# Patient Record
Sex: FEMALE | Race: WHITE | URBAN - METROPOLITAN AREA
[De-identification: names, ages, dates, MRNs, and addresses within clinical notes are randomized per-mention and may not be internally consistent; named-entity substitution may affect disease eponyms.]

---

## 2020-12-14 ENCOUNTER — WEB ENCOUNTER (OUTPATIENT)
Dept: URBAN - METROPOLITAN AREA CLINIC 96 | Facility: CLINIC | Age: 55
End: 2020-12-14

## 2020-12-14 ENCOUNTER — OFFICE VISIT (OUTPATIENT)
Dept: URBAN - METROPOLITAN AREA CLINIC 96 | Facility: CLINIC | Age: 55
End: 2020-12-14
Payer: COMMERCIAL

## 2020-12-14 VITALS
BODY MASS INDEX: 22.26 KG/M2 | HEIGHT: 62 IN | DIASTOLIC BLOOD PRESSURE: 83 MMHG | TEMPERATURE: 95.2 F | HEART RATE: 83 BPM | SYSTOLIC BLOOD PRESSURE: 138 MMHG | WEIGHT: 121 LBS

## 2020-12-14 DIAGNOSIS — K90.0 CELIAC DISEASE: ICD-10-CM

## 2020-12-14 DIAGNOSIS — Z12.11 COLON CANCER SCREENING: ICD-10-CM

## 2020-12-14 PROCEDURE — 99204 OFFICE O/P NEW MOD 45 MIN: CPT | Performed by: INTERNAL MEDICINE

## 2020-12-14 PROCEDURE — 99244 OFF/OP CNSLTJ NEW/EST MOD 40: CPT | Performed by: INTERNAL MEDICINE

## 2020-12-14 RX ORDER — SODIUM, POTASSIUM,MAG SULFATES 17.5-3.13G
354 ML SOLUTION, RECONSTITUTED, ORAL ORAL
Qty: 354 ML | Refills: 0 | OUTPATIENT
Start: 2020-12-14 | End: 2020-12-15

## 2020-12-14 NOTE — HPI-TODAY'S VISIT:
55 y.o. WF, lives by self, used to work for [employer] PCP - Lucas Fuentes Found to have severe osteoporosis  Then, blood work found celiac disease No GI syx Bowels regular w/o blood No rashes Will notice that scratching back after dinner but limited

## 2021-01-20 ENCOUNTER — CLAIMS CREATED FROM THE CLAIM WINDOW (OUTPATIENT)
Dept: URBAN - METROPOLITAN AREA CLINIC 4 | Facility: CLINIC | Age: 56
End: 2021-01-20
Payer: COMMERCIAL

## 2021-01-20 ENCOUNTER — OFFICE VISIT (OUTPATIENT)
Dept: URBAN - METROPOLITAN AREA SURGERY CENTER 18 | Facility: SURGERY CENTER | Age: 56
End: 2021-01-20
Payer: COMMERCIAL

## 2021-01-20 ENCOUNTER — WEB ENCOUNTER (OUTPATIENT)
Dept: URBAN - METROPOLITAN AREA CLINIC 92 | Facility: CLINIC | Age: 56
End: 2021-01-20

## 2021-01-20 DIAGNOSIS — D12.5 ADENOMA OF SIGMOID COLON: ICD-10-CM

## 2021-01-20 DIAGNOSIS — D12.5 BENIGN NEOPLASM OF SIGMOID COLON: ICD-10-CM

## 2021-01-20 DIAGNOSIS — Z12.11 COLON CANCER SCREENING: ICD-10-CM

## 2021-01-20 DIAGNOSIS — K21.9 ACID REFLUX: ICD-10-CM

## 2021-01-20 DIAGNOSIS — K31.89 OTHER DISEASES OF STOMACH AND DUODENUM: ICD-10-CM

## 2021-01-20 DIAGNOSIS — K31.89 DYSPEPSIA AND OTHER SPECIFIED DISORDERS OF FUNCTION OF STOMACH: ICD-10-CM

## 2021-01-20 DIAGNOSIS — K90.0 CELIAC DISEASE: ICD-10-CM

## 2021-01-20 DIAGNOSIS — K21.00 GASTRO-ESOPHAGEAL REFLUX DISEASE WITH ESOPHAGITIS, WITHOUT BLEEDING: ICD-10-CM

## 2021-01-20 DIAGNOSIS — K90.1 SPRUE: ICD-10-CM

## 2021-01-20 PROCEDURE — 43239 EGD BIOPSY SINGLE/MULTIPLE: CPT | Performed by: INTERNAL MEDICINE

## 2021-01-20 PROCEDURE — 88305 TISSUE EXAM BY PATHOLOGIST: CPT | Performed by: PATHOLOGY

## 2021-01-20 PROCEDURE — G8907 PT DOC NO EVENTS ON DISCHARG: HCPCS | Performed by: INTERNAL MEDICINE

## 2021-01-20 PROCEDURE — 88312 SPECIAL STAINS GROUP 1: CPT | Performed by: PATHOLOGY

## 2021-01-20 PROCEDURE — 45385 COLONOSCOPY W/LESION REMOVAL: CPT | Performed by: INTERNAL MEDICINE

## 2021-01-20 PROCEDURE — 88342 IMHCHEM/IMCYTCHM 1ST ANTB: CPT | Performed by: PATHOLOGY

## 2021-01-20 RX ORDER — PANTOPRAZOLE SODIUM 40 MG/1
1 TABLET TABLET, DELAYED RELEASE ORAL
Qty: 180 | Refills: 0 | OUTPATIENT

## 2021-01-22 ENCOUNTER — WEB ENCOUNTER (OUTPATIENT)
Dept: URBAN - METROPOLITAN AREA CLINIC 96 | Facility: CLINIC | Age: 56
End: 2021-01-22

## 2021-01-22 ENCOUNTER — WEB ENCOUNTER (OUTPATIENT)
Dept: URBAN - METROPOLITAN AREA CLINIC 92 | Facility: CLINIC | Age: 56
End: 2021-01-22

## 2021-01-25 ENCOUNTER — OFFICE VISIT (OUTPATIENT)
Dept: URBAN - METROPOLITAN AREA TELEHEALTH 2 | Facility: TELEHEALTH | Age: 56
End: 2021-01-25
Payer: COMMERCIAL

## 2021-01-25 DIAGNOSIS — K90.0 CELIAC DISEASE: ICD-10-CM

## 2021-01-25 PROCEDURE — 97803 MED NUTRITION INDIV SUBSEQ: CPT | Performed by: DIETITIAN, REGISTERED

## 2021-01-25 RX ORDER — PANTOPRAZOLE SODIUM 40 MG/1
1 TABLET TABLET, DELAYED RELEASE ORAL
Qty: 180 | Refills: 0 | Status: ACTIVE | COMMUNITY

## 2021-02-11 ENCOUNTER — WEB ENCOUNTER (OUTPATIENT)
Dept: URBAN - METROPOLITAN AREA CLINIC 96 | Facility: CLINIC | Age: 56
End: 2021-02-11

## 2021-02-12 ENCOUNTER — WEB ENCOUNTER (OUTPATIENT)
Dept: URBAN - METROPOLITAN AREA CLINIC 96 | Facility: CLINIC | Age: 56
End: 2021-02-12

## 2021-03-18 ENCOUNTER — WEB ENCOUNTER (OUTPATIENT)
Dept: URBAN - METROPOLITAN AREA CLINIC 96 | Facility: CLINIC | Age: 56
End: 2021-03-18

## 2021-03-18 RX ORDER — PANTOPRAZOLE SODIUM 40 MG/1
1 TABLET TABLET, DELAYED RELEASE ORAL ONCE A DAY
Qty: 90 TABLET | Refills: 3 | OUTPATIENT

## 2021-05-03 ENCOUNTER — LAB OUTSIDE AN ENCOUNTER (OUTPATIENT)
Dept: URBAN - METROPOLITAN AREA CLINIC 96 | Facility: CLINIC | Age: 56
End: 2021-05-03

## 2021-05-03 ENCOUNTER — WEB ENCOUNTER (OUTPATIENT)
Dept: URBAN - METROPOLITAN AREA CLINIC 96 | Facility: CLINIC | Age: 56
End: 2021-05-03

## 2021-05-03 ENCOUNTER — OFFICE VISIT (OUTPATIENT)
Dept: URBAN - METROPOLITAN AREA CLINIC 96 | Facility: CLINIC | Age: 56
End: 2021-05-03
Payer: COMMERCIAL

## 2021-05-03 DIAGNOSIS — K90.0 CELIAC DISEASE: ICD-10-CM

## 2021-05-03 DIAGNOSIS — M81.8 OTHER OSTEOPOROSIS, UNSPECIFIED PATHOLOGICAL FRACTURE PRESENCE: ICD-10-CM

## 2021-05-03 LAB
ABSOLUTE BASOPHIL COUNT: 0.04
ABSOLUTE EOSINOPHIL COUNT: 0.51
ABSOLUTE IMMATURE GRANULOCYTE  COUNT: 0.02
ABSOLUTE LYMPHOCYTE COUNT: 1.78
ABSOLUTE MONOCYTE COUNT: 0.47
ABSOLUTE NEUTROPHIL COUNT (ANC): 3.55
ABSOLUTE NRBC  COUNT: 0
AG RATIO: 1
ALBUMIN LEVEL: 4.4
ALK PHOS: 90
ALT: 17
ANION GAP: 9
AST: 24
BASOPHIL AUTO: 1
BILIRUBIN TOTAL: 0.3
BUN/CREAT RATIO: 24
BUN: 18
CALCIUM LEVEL: 9.4
CHLORIDE LEVEL: 103
CO2 LEVEL: 25
CREATININE LEVEL: 0.7
EOS AUTO: 8
FERRITIN LEVEL: 5.5
FOLATE LEVEL: 7.5
GFR AFRICAN AMERICAN: >60
GFR NON AFRICAN AMERICAN: >60
GLUCOSE LEVEL: 103
HCT: 36
HGB: 11.3
IMMATURE GRANULOCYTES AUTO: 0.3
IRON LEVEL: 22
IRON SAT: 5
LYMPH AUTO: 28
MCH: 24.6
MCHC: 31.4
MCV: 78.4
MONO AUTO: 7
MPV: 8.8
NEUTRO AUTO: 56
NRBC AUTO: 0
OSMO (CALC): 276
PERFORMING LAB: (no result)
PLATELETS: 343
POTASSIUM LEVEL: 4.3
PROTEIN TOTAL: 7.7
RBC: 4.59
RDW: 16.5
SODIUM LEVEL: 137
TIBC: 434
VITAMIN B12 LEVEL: 465
VITAMIN D 25 OH: 33
WBC: 6.4

## 2021-05-03 PROCEDURE — 99214 OFFICE O/P EST MOD 30 MIN: CPT | Performed by: INTERNAL MEDICINE

## 2021-05-03 RX ORDER — PANTOPRAZOLE SODIUM 40 MG/1
1 TABLET TABLET, DELAYED RELEASE ORAL
Qty: 180 | Refills: 0 | Status: ACTIVE | COMMUNITY

## 2021-05-03 RX ORDER — PANTOPRAZOLE SODIUM 40 MG/1
1 TABLET TABLET, DELAYED RELEASE ORAL ONCE A DAY
Qty: 90 TABLET | Refills: 3 | Status: ACTIVE | COMMUNITY

## 2021-05-03 NOTE — HPI-TODAY'S VISIT:
Patient had egd and colon 1/2021 - Celiac DZ and erosive esophagitis - Tubular adenoma Recommended follow up colon in 5 years She took the Pantoprazole 2 po bid for 8 weeks and then 1 po qd for 8 weeks No heartburn or indigestion - no dysphagia - never had daily symptoms to start with BM are wnl - no brbpr or melena Does not recall having full work up of labs in the past - vitamin level

## 2021-05-05 LAB
PERFORMING LAB: (no result)
SOURCE: (no result)

## 2021-05-06 ENCOUNTER — WEB ENCOUNTER (OUTPATIENT)
Dept: URBAN - METROPOLITAN AREA CLINIC 96 | Facility: CLINIC | Age: 56
End: 2021-05-06

## 2021-05-06 LAB
PERFORMING LAB: (no result)
PERFORMING LAB: (no result)
SOURCE: (no result)
SOURCE: (no result)

## 2021-05-12 ENCOUNTER — WEB ENCOUNTER (OUTPATIENT)
Dept: URBAN - METROPOLITAN AREA CLINIC 96 | Facility: CLINIC | Age: 56
End: 2021-05-12

## 2022-06-20 ENCOUNTER — WEB ENCOUNTER (OUTPATIENT)
Dept: URBAN - METROPOLITAN AREA CLINIC 96 | Facility: CLINIC | Age: 57
End: 2022-06-20

## 2022-06-23 ENCOUNTER — WEB ENCOUNTER (OUTPATIENT)
Dept: URBAN - METROPOLITAN AREA CLINIC 96 | Facility: CLINIC | Age: 57
End: 2022-06-23

## 2022-06-23 LAB
A/G RATIO: 2.2
ALBUMIN: 5
ALKALINE PHOSPHATASE: 72
ALT (SGPT): 14
AST (SGOT): 19
BASO (ABSOLUTE): 0
BASOS: 1
BILIRUBIN, TOTAL: 0.4
BUN/CREATININE RATIO: 20
BUN: 12
CALCIUM: 9.7
CARBON DIOXIDE, TOTAL: 25
CHLORIDE: 99
CREATININE: 0.59
EGFR: 104
ENDOMYSIAL ANTIBODY IGA: NEGATIVE
EOS (ABSOLUTE): 0.3
EOS: 5
FERRITIN, SERUM: 109
GLOBULIN, TOTAL: 2.3
GLUCOSE: 108
HEMATOCRIT: 45.1
HEMATOLOGY COMMENTS:: (no result)
HEMOGLOBIN: 15.3
IMMATURE CELLS: (no result)
IMMATURE GRANS (ABS): 0
IMMATURE GRANULOCYTES: 0
IMMUNOGLOBULIN A, QN, SERUM: 182
IRON BIND.CAP.(TIBC): 370
IRON SATURATION: 28
IRON: 103
LYMPHS (ABSOLUTE): 1.7
LYMPHS: 26
MCH: 32.6
MCHC: 33.9
MCV: 96
MONOCYTES(ABSOLUTE): 0.5
MONOCYTES: 7
NEUTROPHILS (ABSOLUTE): 4
NEUTROPHILS: 61
NRBC: (no result)
PLATELETS: 295
POTASSIUM: 4.1
PROTEIN, TOTAL: 7.3
RBC: 4.69
RDW: 12.4
SODIUM: 141
T-TRANSGLUTAMINASE (TTG) IGA: 3
UIBC: 267
VITAMIN D, 25-HYDROXY: 35.7
WBC: 6.6

## 2022-07-05 ENCOUNTER — WEB ENCOUNTER (OUTPATIENT)
Dept: URBAN - METROPOLITAN AREA CLINIC 96 | Facility: CLINIC | Age: 57
End: 2022-07-05

## 2022-07-20 ENCOUNTER — DASHBOARD ENCOUNTERS (OUTPATIENT)
Age: 57
End: 2022-07-20

## 2022-07-20 ENCOUNTER — TELEPHONE ENCOUNTER (OUTPATIENT)
Dept: URBAN - METROPOLITAN AREA CLINIC 96 | Facility: CLINIC | Age: 57
End: 2022-07-20

## 2022-07-20 ENCOUNTER — OFFICE VISIT (OUTPATIENT)
Dept: URBAN - METROPOLITAN AREA TELEHEALTH 2 | Facility: TELEHEALTH | Age: 57
End: 2022-07-20
Payer: COMMERCIAL

## 2022-07-20 DIAGNOSIS — M81.8 OTHER OSTEOPOROSIS, UNSPECIFIED PATHOLOGICAL FRACTURE PRESENCE: ICD-10-CM

## 2022-07-20 DIAGNOSIS — K90.0 CELIAC DISEASE: ICD-10-CM

## 2022-07-20 PROBLEM — 64859006 OSTEOPOROSIS: Status: ACTIVE | Noted: 2022-07-20

## 2022-07-20 PROBLEM — 396331005 CELIAC DISEASE: Status: ACTIVE | Noted: 2020-12-14

## 2022-07-20 PROCEDURE — 99213 OFFICE O/P EST LOW 20 MIN: CPT | Performed by: INTERNAL MEDICINE

## 2022-07-20 RX ORDER — PANTOPRAZOLE SODIUM 40 MG/1
1 TABLET TABLET, DELAYED RELEASE ORAL ONCE A DAY
Qty: 90 TABLET | Refills: 3 | Status: ACTIVE | COMMUNITY

## 2022-07-20 RX ORDER — PANTOPRAZOLE SODIUM 40 MG/1
1 TABLET TABLET, DELAYED RELEASE ORAL
Qty: 180 | Refills: 0 | Status: ACTIVE | COMMUNITY

## 2022-07-20 NOTE — HPI-TODAY'S VISIT:
Patient presents for yearly follow up for celiac disease via telehealth Labs done in 6/2022 prior to telehealth visit  ok per Dr. Man Patient is currently being treating with Tymlos for osteoporosis- finishes treatment in 10/2022. Will have repeat DEXA scan following finishing course  Currently taking Vitamin D daily- advised to discontinue calcium as Tymlos can elevated calcium levels  Patient endorses she feels otherwise well. Was having unexplained headaches but that has resolved. She believes stress related.  BMs are described as regular  Denies BRBPR or melena Denies abdominal pain She states she has not had penumococcal vaccine with PCP

## 2022-07-23 ENCOUNTER — WEB ENCOUNTER (OUTPATIENT)
Dept: URBAN - METROPOLITAN AREA CLINIC 96 | Facility: CLINIC | Age: 57
End: 2022-07-23